# Patient Record
Sex: FEMALE | Race: BLACK OR AFRICAN AMERICAN
[De-identification: names, ages, dates, MRNs, and addresses within clinical notes are randomized per-mention and may not be internally consistent; named-entity substitution may affect disease eponyms.]

---

## 2019-12-17 ENCOUNTER — HOSPITAL ENCOUNTER (EMERGENCY)
Dept: HOSPITAL 72 - EMR | Age: 54
Discharge: HOME | End: 2019-12-17
Payer: COMMERCIAL

## 2019-12-17 VITALS — DIASTOLIC BLOOD PRESSURE: 79 MMHG | SYSTOLIC BLOOD PRESSURE: 140 MMHG

## 2019-12-17 VITALS — BODY MASS INDEX: 42.59 KG/M2 | HEIGHT: 66 IN | WEIGHT: 265 LBS

## 2019-12-17 VITALS — DIASTOLIC BLOOD PRESSURE: 72 MMHG | SYSTOLIC BLOOD PRESSURE: 121 MMHG

## 2019-12-17 VITALS — SYSTOLIC BLOOD PRESSURE: 116 MMHG | DIASTOLIC BLOOD PRESSURE: 68 MMHG

## 2019-12-17 VITALS — DIASTOLIC BLOOD PRESSURE: 88 MMHG | SYSTOLIC BLOOD PRESSURE: 121 MMHG

## 2019-12-17 DIAGNOSIS — S93.601A: Primary | ICD-10-CM
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
## 2019-12-17 DIAGNOSIS — Z88.0: ICD-10-CM
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
## 2019-12-17 DIAGNOSIS — Z86.711: ICD-10-CM

## 2019-12-17 PROCEDURE — 73562 X-RAY EXAM OF KNEE 3: CPT

## 2019-12-17 PROCEDURE — 73610 X-RAY EXAM OF ANKLE: CPT

## 2019-12-17 PROCEDURE — 73630 X-RAY EXAM OF FOOT: CPT

## 2019-12-17 PROCEDURE — 99284 EMERGENCY DEPT VISIT MOD MDM: CPT

## 2019-12-17 PROCEDURE — 81025 URINE PREGNANCY TEST: CPT

## 2019-12-17 NOTE — EMERGENCY ROOM REPORT
History of Present Illness


General


Chief Complaint:  Multiple Trauma/Fall


Source:  Patient, Medical Record





Present Illness


HPI


53yo F with hx of lupus who presents to ER sp fall. she reported tripping and 

losing her balance 2 days ago. she fell onto her right knee, ankle and foot. 

pain is worse in foot and reported moderate swelling over anterior foot despite 

icing and using her home pain medication. Pt denies head injury or LOC.


Allergies:  


Coded Allergies:  


     PENICILLINS (Verified  Allergy, Severe, Shortness of Breath, 12/28/17)





Patient History


PMH Narrative


lupus, asthma





Nursing Documentation-PMH


Past Medical History:  No History, Except For


Hx Cardiac Problems:  No


Hx Hypertension:  No - LUPUS, PE


Hx Pacemaker:  No


Hx Asthma:  Yes


Hx COPD:  Yes


Hx Diabetes:  No


Hx Cancer:  No


Hx Gastrointestinal Problems:  No


Hx Dialysis:  No


History Of Psychiatric Problem:  No


Hx Neurological Problems:  Yes - SLE


Hx Cerebrovascular Accident:  No


Hx Seizures:  No





Review of Systems


Constitutional:  Denies: chills, fever


Respiratory:  Denies: cough, shortness of breath


Cardiovascular:  Denies: chest pain, palpitations


Gastrointestinal:  Denies: diarrhea, vomiting


Genitourinary:  Denies: hematuria, pain


Musculoskeletal:  Reports: joint pain; Denies: joint swelling


Skin:  Denies: rash, lesions


Neurological:  Denies: headache, dizziness





Physical Exam





Vital Signs








  Date Time  Temp Pulse Resp B/P (MAP) Pulse Ox O2 Delivery O2 Flow Rate FiO2


 


12/17/19 18:20 98.5 85 18 121/88 100 Room Air  








Sp02 EP Interpretation:  reviewed


General Appearance:  well appearing, no apparent distress, non-toxic


Head:  normocephalic, atraumatic


Eyes:  bilateral eye normal inspection


ENT:  hearing grossly normal, EOM grossly intact, moist mucus membranes


Neck:  supple


Respiratory:  lungs clear, normal breath sounds, no respiratory distress, 

speaking full sentences


Cardiovascular #1:  regular rate, rhythm, normal capillary refill


Cardiovascular #2:  2+ radial (R), 2+ radial (L)


Gastrointestinal:  soft, non-distended


Rectal:  deferred


Musculoskeletal:  normal inspection, back normal, normal range of motion, moves 

extm spontaneously, no lower extremity edema, tender - tenderness of right knee

, right foot and right ankle. , swelling - right foot, anteiror aspect.


Neurologic:  alert, motor strength/tone normal, grossly normal


Psychiatric:  mood/affect normal


Skin:  warm/dry, normal turgor





Medical Decision Making


Diagnostic Impression:  


 Primary Impression:  


 Right foot sprain


ER Course


right foot, ankle and knee pain sp fall. with mild edema to anterior to right 

foot. 


will do xrays and reassess


Other X-Ray Diagnostic Results


Other X-Ray Diagnostic Results :  


   X-Ray ordered:  X-ray foot, x-ray ankle, x-ray knee


   Interpretation:  no dislocation, no soft tissue swelling, no fractures





Last Vital Signs








  Date Time  Temp Pulse Resp B/P (MAP) Pulse Ox O2 Delivery O2 Flow Rate FiO2


 


12/17/19 20:20 98.1 72 18 140/79 100 Room Air  








Reevaluation Impression


xrays wnl. no signs of fracture. pt placed in ACE and discharged with follow up 

with orthopedist.


Disposition:  HOME, SELF-CARE


Condition:  Stable


Scripts


Hydrocodone Bit/Acetaminophen 5-325* (NORCO 5-325*) 1 Each Tablet


1 TAB ORAL Q6H PRN for For Pain, #10 TAB 0 Refills


   Prov: Jonathan Gomez M.D.         12/17/19


Referrals:  


NON PHYSICIAN (PCP)











[facility]


Patient Instructions:  Ankle Replacement, Care After, Foot Sprain





Additional Instructions:  


Please follow-up with your primary care doctor and orthopedist in 1 to 2 days 

for reevaluation











Jonathan Gomez M.D. Dec 17, 2019 20:53

## 2019-12-17 NOTE — DIAGNOSTIC IMAGING REPORT
Indication: Pain, status post fall

 

Technique: 3 views right foot

 

Comparison: 10/2/2015

 

Findings: No acute fractures. No dislocations. There is mild bunion formation. The

joint spaces are preserved. There are plantar and calcaneal spurs. No significant

interim change

 

Impression: No acute bony trauma

 

This agrees with the preliminary interpretation provided overnight by [facility]

teleradiology service.

## 2019-12-17 NOTE — DIAGNOSTIC IMAGING REPORT
Indication: Pain, status post fall

 

Technique: 3 views of the right knee

 

Comparison: None

 

Findings: There are degenerative changes of the patellofemoral joint. There are

medial and lateral osteophytes. The lateral and medial joint compartment spaces are

preserved. No acute fractures. No dislocations. No suprapatellar effusion

 

Impression: Degenerative changes

 

No acute bony trauma

 

This agrees with the preliminary interpretation provided overnight by [facility]

teleradiology service.

## 2019-12-17 NOTE — DIAGNOSTIC IMAGING REPORT
Indication: Pain, status post fall

 

Technique: 3 views of the right ankle

 

Comparison: none 

 

Findings: Bony alignment is normal. No acute fractures. No dislocations. There are

calcaneal and plantar spurs

 

Impression: No acute bony trauma

 

This agrees with the preliminary interpretation provided overnight by [facility]

teleradiology service.

## 2020-01-17 ENCOUNTER — HOSPITAL ENCOUNTER (EMERGENCY)
Dept: HOSPITAL 72 - EMR | Age: 55
Discharge: HOME | End: 2020-01-17
Payer: COMMERCIAL

## 2020-01-17 VITALS — BODY MASS INDEX: 43.15 KG/M2 | WEIGHT: 259 LBS | HEIGHT: 65 IN

## 2020-01-17 VITALS — SYSTOLIC BLOOD PRESSURE: 146 MMHG | DIASTOLIC BLOOD PRESSURE: 76 MMHG

## 2020-01-17 VITALS — DIASTOLIC BLOOD PRESSURE: 74 MMHG | SYSTOLIC BLOOD PRESSURE: 129 MMHG

## 2020-01-17 VITALS — SYSTOLIC BLOOD PRESSURE: 129 MMHG | DIASTOLIC BLOOD PRESSURE: 74 MMHG

## 2020-01-17 DIAGNOSIS — S92.511A: Primary | ICD-10-CM

## 2020-01-17 DIAGNOSIS — J44.9: ICD-10-CM
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
## 2020-01-17 DIAGNOSIS — Z88.0: ICD-10-CM

## 2020-01-17 DIAGNOSIS — S80.02XA: ICD-10-CM

## 2020-01-17 DIAGNOSIS — M32.9: ICD-10-CM

## 2020-01-17 DIAGNOSIS — R10.84: ICD-10-CM
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
## 2020-01-17 DIAGNOSIS — Z91.81: ICD-10-CM

## 2020-01-17 DIAGNOSIS — J45.909: ICD-10-CM

## 2020-01-17 DIAGNOSIS — K59.00: ICD-10-CM

## 2020-01-17 DIAGNOSIS — R14.0: ICD-10-CM
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
## 2020-01-17 LAB
ADD MANUAL DIFF: NO
ALBUMIN SERPL-MCNC: 3.2 G/DL (ref 3.4–5)
ALBUMIN/GLOB SERPL: 0.7 {RATIO} (ref 1–2.7)
ALP SERPL-CCNC: 96 U/L (ref 46–116)
ALT SERPL-CCNC: 27 U/L (ref 12–78)
ANION GAP SERPL CALC-SCNC: 9 MMOL/L (ref 5–15)
APPEARANCE UR: CLEAR
APTT PPP: (no result) S
AST SERPL-CCNC: 15 U/L (ref 15–37)
BASOPHILS NFR BLD AUTO: 1.8 % (ref 0–2)
BILIRUB SERPL-MCNC: 0.2 MG/DL (ref 0.2–1)
BUN SERPL-MCNC: 11 MG/DL (ref 7–18)
CALCIUM SERPL-MCNC: 10.4 MG/DL (ref 8.5–10.1)
CHLORIDE SERPL-SCNC: 104 MMOL/L (ref 98–107)
CO2 SERPL-SCNC: 24 MMOL/L (ref 21–32)
CREAT SERPL-MCNC: 0.8 MG/DL (ref 0.55–1.3)
EOSINOPHIL NFR BLD AUTO: 4.4 % (ref 0–3)
ERYTHROCYTE [DISTWIDTH] IN BLOOD BY AUTOMATED COUNT: 15.8 % (ref 11.6–14.8)
GLOBULIN SER-MCNC: 4.8 G/DL
GLUCOSE UR STRIP-MCNC: NEGATIVE MG/DL
HCT VFR BLD CALC: 38.8 % (ref 37–47)
HGB BLD-MCNC: 12.4 G/DL (ref 12–16)
KETONES UR QL STRIP: NEGATIVE
LEUKOCYTE ESTERASE UR QL STRIP: NEGATIVE
LYMPHOCYTES NFR BLD AUTO: 28.1 % (ref 20–45)
MCV RBC AUTO: 89 FL (ref 80–99)
MONOCYTES NFR BLD AUTO: 5.3 % (ref 1–10)
NEUTROPHILS NFR BLD AUTO: 60.4 % (ref 45–75)
NITRITE UR QL STRIP: NEGATIVE
PH UR STRIP: 6 [PH] (ref 4.5–8)
PLATELET # BLD: 280 K/UL (ref 150–450)
POTASSIUM SERPL-SCNC: 3.6 MMOL/L (ref 3.5–5.1)
PROT UR QL STRIP: NEGATIVE
RBC # BLD AUTO: 4.37 M/UL (ref 4.2–5.4)
SODIUM SERPL-SCNC: 137 MMOL/L (ref 136–145)
SP GR UR STRIP: 1.01 (ref 1–1.03)
UROBILINOGEN UR-MCNC: NORMAL MG/DL (ref 0–1)
WBC # BLD AUTO: 7.2 K/UL (ref 4.8–10.8)

## 2020-01-17 PROCEDURE — 99284 EMERGENCY DEPT VISIT MOD MDM: CPT

## 2020-01-17 PROCEDURE — 36415 COLL VENOUS BLD VENIPUNCTURE: CPT

## 2020-01-17 PROCEDURE — 83690 ASSAY OF LIPASE: CPT

## 2020-01-17 PROCEDURE — 73562 X-RAY EXAM OF KNEE 3: CPT

## 2020-01-17 PROCEDURE — 80053 COMPREHEN METABOLIC PANEL: CPT

## 2020-01-17 PROCEDURE — 96374 THER/PROPH/DIAG INJ IV PUSH: CPT

## 2020-01-17 PROCEDURE — 85025 COMPLETE CBC W/AUTO DIFF WBC: CPT

## 2020-01-17 PROCEDURE — 74018 RADEX ABDOMEN 1 VIEW: CPT

## 2020-01-17 PROCEDURE — 96375 TX/PRO/DX INJ NEW DRUG ADDON: CPT

## 2020-01-17 PROCEDURE — 73630 X-RAY EXAM OF FOOT: CPT

## 2020-01-17 PROCEDURE — 81003 URINALYSIS AUTO W/O SCOPE: CPT

## 2020-01-17 NOTE — DIAGNOSTIC IMAGING REPORT
EXAM:

  XR Left Knee, 3 Views

 

CLINICAL HISTORY:

  TRAUMA

 

TECHNIQUE:

  Three views of the left knee.

 

COMPARISON:

  X-ray dated 12/17/2019

 

FINDINGS:

  Bones/joints:  No acute fracture or traumatic malalignment.  

Tricompartment degenerative changes.

  Soft tissues:  Unremarkable.

 

IMPRESSION:     

  No acute fracture or traumatic malalignment.

## 2020-01-17 NOTE — DIAGNOSTIC IMAGING REPORT
EXAM:

  XR Abdomen, 2 Views

 

CLINICAL HISTORY:

  ABD DIST

 

TECHNIQUE:

  Frontal view of the abdomen/pelvis with upright view of the abdomen.

 

COMPARISON:

  No relevant prior studies available.

 

FINDINGS:

  Intraperitoneal space:  No free air.

  Gastrointestinal tract:  Unremarkable.  No dilation.

  Bones/joints:  Unremarkable.

 

IMPRESSION:     

  Normal abdominal x-rays.

## 2020-01-17 NOTE — DIAGNOSTIC IMAGING REPORT
EXAM:

  XR Right Foot Complete, 3 or More Views

 

CLINICAL HISTORY:

  TRAUMA

 

TECHNIQUE:

  Frontal, lateral and oblique views of the right foot.

 

COMPARISON:

  No relevant prior studies available.

 

FINDINGS:

  Bones/joints:  Question acute fracture of the distal shaft of the 

proximal phalanx.  The first digit with intra-articular extension.  Small 

calcaneal heel spur.  No dislocation.

  Soft tissues:  Moderate edema seen within the soft tissues of the 

dorsal foot.  No radiopaque foreign body.

 

IMPRESSION:     

1.  Question acute fracture of the distal shaft of the proximal phalanx.  

The first digit with intra-articular extension.

2.  Moderate edema seen within the soft tissues of the dorsal foot.

## 2020-01-17 NOTE — NUR
ED Nurse Note:



pt walked in with assist from home due to abdominal discomfort x4 days and both 
legs pain from ground level fall from 2 days ago. pt aao x4 and non ambulatory 
due to bilateral leg pain but able to pivot to transfer at this moment. skin 
clean and intact and no visible wound or bruise noted. calm and cooperative. no 
cardiac or pulmonary distress noted at this time. pt is in gown.

## 2020-01-17 NOTE — EMERGENCY ROOM REPORT
History of Present Illness


General


Chief Complaint:  Multiple Trauma/Fall


Source:  Patient





Present Illness


HPI


Disclaimer: Please note that this report is being documented using DRAGON 

technology. This can lead to erroneous entry secondary to incorrect 

interpretation by the dictating instrument.





HPI: 52-year-old female with history of lupus anticoagulant disorder presents 

for evaluation after a fall 3 days ago as well as abdominal pain.  Patient 

states that she felt her left knee buckled while walking the other day falling 

forward onto her left knee causing moderate pain and swelling and difficulty 

ambulating over the past 3 days.  Is also in the emergency department recently 

for twisting her right foot and ankle stepping into a manhole accidentally.  On 

her most recent fall which she struck her knee she states that she also fell 

forward hitting her chin against the ground.  There was no loss of conscious, 

no seizure, no headache, no neck or back pain.  States she had some pain in the 

jaw but is still able to speak and eat without difficulty.  Also notes some 

pain over the bridge of the nose but denies any swelling, no black eyes, no 

changes in her vision and no swelling of the face.  Secondly, the patient is 

complaining of abdominal distention.  She notes generalized pain and discomfort 

and has been constipated for approximately 5 days.  Reports no bowel movements 

but is still moving gas.  History of constipation in the past.  Denies vomiting

, diarrhea, dysuria or hematuria.  Is not taking any medications.  No other 

injury or complaints reported.


 


PMH: Lupus anticoagulant disorder, asthma


 


PSH: Denies


 


Allergies: Penicillin


 


Social Hx: Denies


Allergies:  


Coded Allergies:  


     PENICILLINS (Verified  Allergy, Severe, Shortness of Breath, 12/28/17)





Nursing Documentation-PMH


Past Medical History:  No History, Except For


Hx Cardiac Problems:  No


Hx Hypertension:  No - LUPUS, PE


Hx Pacemaker:  No


Hx Asthma:  Yes


Hx COPD:  Yes


Hx Diabetes:  No


Hx Cancer:  No


Hx Gastrointestinal Problems:  No


Hx Dialysis:  No


Hx Neurological Problems:  Yes - SLE


Hx Cerebrovascular Accident:  No


Hx Seizures:  No





Review of Systems


All Other Systems:  negative except mentioned in HPI





Physical Exam





Vital Signs








  Date Time  Temp Pulse Resp B/P (MAP) Pulse Ox O2 Delivery O2 Flow Rate FiO2


 


1/17/20 17:50 98.2 84 19 146/76 100 Room Air  





 





General: Awake and alert, no acute distress


HEENT: NC/AT. EOMI. PERRLA.  No midface or mandible instability.  Mild 

tenderness over the bridge of the nose without deformity.  No periorbital edema 

or ecchymosis.  No TMJ tenderness.  Full range of motion in the mandible.  No 

deformity or focal tenderness.  Poor dentition but no obvious injury.


Neck: Supple, trachea midline


Chest Wall: No tenderness, no deformity


Cardiovascular: RRR.  S1 and S2 normal.  No murmur appreciated


Resp: Normal work of breathing. No cough, wheezing or crackles appreciated


Abdomen: Abdomen is soft, nondistended.  Abdomen is obese and distended.  

Generalized tenderness without focal tenderness.  No rebound.


Skin: Intact.  No abrasions, laceration or rash over the exposed skin


MSK: Normal tone and bulk. Moving all extremities.  No obvious deformity.  

Tenderness palpation over the left patella.  Able to flex and extend the knee.  

Patella is in anatomic position.  No laxity on varus or valgus testing.  Mild 

edema circumferentially.  2+ PT and DP pulses distally.  Bilateral lower 

extremity edema.


Neuro: Awake and alert.  Mentating appropriately.





Medical Decision Making


Diagnostic Impression:  


 Primary Impression:  


 Fracture of phalanx of right foot, closed


 Additional Impressions:  


 Contusion of left knee


 Constipation


 Abdominal distension


 Abdominal pain


ER Course


54-year-old female presents for evaluation of left knee pain after a fall 3 

days ago, persistent right ankle pain after a fall several weeks ago, jaw and 

facial pain as well as abdominal distention and constipation.  Patient does not 

show signs of facial bone or mandible fracture and she is able to speak in full 

sentences, no edema, only mild tenderness and no palpable deformities.  There 

is tenderness in the patella will obtain x-rays.  We will also re-x-ray the 

right ankle given the repeat fall.  Also obtain an x-ray of the abdomen as well 

as belly labs though suspect constipation is the main culprit which the patient 

agrees.  Give IV fluids and pain medication.





Laboratory Tests








Test


  1/17/20


16:30 1/17/20


19:00


 


Urine Color Pale yellow   


 


Urine Appearance Clear   


 


Urine pH 6 (4.5-8.0)   


 


Urine Specific Gravity


  1.015


(1.005-1.035) 


 


 


Urine Protein


  Negative


(NEGATIVE) 


 


 


Urine Glucose (UA)


  Negative


(NEGATIVE) 


 


 


Urine Ketones


  Negative


(NEGATIVE) 


 


 


Urine Blood


  1+ (NEGATIVE)


H 


 


 


Urine Nitrite


  Negative


(NEGATIVE) 


 


 


Urine Bilirubin


  2+ (NEGATIVE)


H 


 


 


Urine Ictotest


  Negative


(NEGATIVE) 


 


 


Urine Urobilinogen


  Normal MG/DL


(0.0-1.0) 


 


 


Urine Leukocyte Esterase


  Negative


(NEGATIVE) 


 


 


Urine RBC


  2-4 /HPF (0 -


2)  H 


 


 


Urine WBC


  0 /HPF (0 - 2)


  


 


 


Urine Squamous Epithelial


Cells Occasional


/LPF 


 


 


Urine Bacteria


  Few /HPF


(NONE) 


 


 


White Blood Count


  


  7.2 K/UL


(4.8-10.8)


 


Red Blood Count


  


  4.37 M/UL


(4.20-5.40)


 


Hemoglobin


  


  12.4 G/DL


(12.0-16.0)


 


Hematocrit


  


  38.8 %


(37.0-47.0)


 


Mean Corpuscular Volume  89 FL (80-99)  


 


Mean Corpuscular Hemoglobin


  


  28.4 PG


(27.0-31.0)


 


Mean Corpuscular Hemoglobin


Concent 


  32.0 G/DL


(32.0-36.0)


 


Red Cell Distribution Width


  


  15.8 %


(11.6-14.8)  H


 


Platelet Count


  


  280 K/UL


(150-450)


 


Mean Platelet Volume


  


  7.6 FL


(6.5-10.1)


 


Neutrophils (%) (Auto)


  


  60.4 %


(45.0-75.0)


 


Lymphocytes (%) (Auto)


  


  28.1 %


(20.0-45.0)


 


Monocytes (%) (Auto)


  


  5.3 %


(1.0-10.0)


 


Eosinophils (%) (Auto)


  


  4.4 %


(0.0-3.0)  H


 


Basophils (%) (Auto)


  


  1.8 %


(0.0-2.0)


 


Sodium Level


  


  137 MMOL/L


(136-145)


 


Potassium Level


  


  3.6 MMOL/L


(3.5-5.1)


 


Chloride Level


  


  104 MMOL/L


()


 


Carbon Dioxide Level


  


  24 MMOL/L


(21-32)


 


Anion Gap


  


  9 mmol/L


(5-15)


 


Blood Urea Nitrogen


  


  11 mg/dL


(7-18)


 


Creatinine


  


  0.8 MG/DL


(0.55-1.30)


 


Estimate Glomerular


Filtration Rate 


  > 60 mL/min


(>60)


 


Glucose Level


  


  115 MG/DL


()  H


 


Calcium Level


  


  10.4 MG/DL


(8.5-10.1)  H


 


Total Bilirubin


  


  0.2 MG/DL


(0.2-1.0)


 


Aspartate Amino Transferase


(AST) 


  15 U/L (15-37)


 


 


Alanine Aminotransferase (ALT)


  


  27 U/L (12-78)


 


 


Alkaline Phosphatase


  


  96 U/L


()


 


Total Protein


  


  8.0 G/DL


(6.4-8.2)


 


Albumin


  


  3.2 G/DL


(3.4-5.0)  L


 


Globulin  4.8 g/dL  


 


Albumin/Globulin Ratio


  


  0.7 (1.0-2.7)


L


 


Lipase


  


  169 U/L


()








Other X-Ray Diagnostic Results


Other X-Ray Diagnostic Results #1:  


   X-Ray ordered:  Left knee


   # of Views/Limited Vs Complete:  Complete


   Indication:  Pain


   EP Interpretation:  Yes


   Interpretation:  no dislocation, no soft tissue swelling, no fractures


   Impression:  No acute disease


   Electronically Signed by:  Electronically signed by Dr. Matty Vieira


Other X-Ray Diagnostic Results #2:  


   X-Ray ordered:  Right foot


   # of Views/Limited Vs Complete:  Complete


   Indication:  Pain


   EP Interpretation:  Yes


   Interpretation:  no dislocation, other - poss oblique fracture of the 

proximal phalanx first digit


   Impression:  Other - Possible oblique fracture of the first digit proximal 

phalanx


   Electronically Signed by:  Electronically signed by Dr. Matty Vieira


Other X-Ray Diagnostic Results #3:  


   X-Ray ordered:  Abdomen


   # of Views/Limited Vs Complete:  1 View


   Indication:  Swelling


   Interpretation:  nonspecific bowel gas, no sbo


   Impression:  Other - Significant constipation stool in the rectum


   Electronically Signed by:  Electronically signed by Dr. Matty Vieira


Reevaluation Time:  20:21





Last Vital Signs








  Date Time  Temp Pulse Resp B/P (MAP) Pulse Ox O2 Delivery O2 Flow Rate FiO2


 


1/17/20 17:53 98.1 85 19 104/70 (81) 98 Room Air  








Reevaluation Impression


CT concerning for an oblique fracture of the proximal phalanx first digit on 

the right foot.  This may be an acute injury from the patient's recent fall 

over a more remote injury from a previous fall.  Will be placed in a postop 

shoe.  No evidence of fracture dislocation on the left knee will apply Ace 

bandage for comfort and stability.  Abdominal x-ray does not show obstruction 

but does show moderate stool burden.  Her abdominal distention discomfort 

likely secondary constipation.  She states she has not had an adequate bowel 

movement in 3 to 4 days.  She is on MiraLAX and docusate but we will add 

magnesium citrate and enema to be performed at home.  She can follow-up with 

PMD and gastroenterology for further care and work-up of constipation.  She 

will be given crutches and follow-up with orthopedic surgery for her orthopedic 

injuries.  Discussed reasons to return to the emergency department.  She 

understands and agrees with this treatment plan.


Disposition:  HOME, SELF-CARE


Condition:  Stable


Scripts


Magnesium Citrate (MAGNESIUM CITRATE) 296 Ml Solution


296 ML PO BID for 2 Days, #1000 ML


   Prov: Matty Vieira MD         1/17/20 


Na KhrisM-B/Na Phos,Di-Ba* (FLEET ENEMA*) 133 Ml Enema


133 ML RECTAL DAILY for 1 Day, #133 ML 0 Refills


   Prov: Matty Vieira MD         1/17/20


Referrals:  


[facility]FERRED [facility],REFERRING (PCP)











Matty Vieira MD Jan 17, 2020 18:44

## 2020-01-17 NOTE — NUR
ED Nurse Note:



Recieved reprot to resume care, pt in bed awake, alert and oriented x 4, pt has 
patent IV line and recently medicted for foot pain, pt continues to report pain 
at 9/10 and asking for morphine, MD aware, pt also here for constipation due to 
narcotic use, pt denies cp, sob or any other complaints, waiting for results 
and disposition.